# Patient Record
Sex: MALE | Race: WHITE | URBAN - METROPOLITAN AREA
[De-identification: names, ages, dates, MRNs, and addresses within clinical notes are randomized per-mention and may not be internally consistent; named-entity substitution may affect disease eponyms.]

---

## 2024-05-28 ENCOUNTER — TELEPHONE (OUTPATIENT)
Dept: PRIMARY CARE | Facility: CLINIC | Age: 24
End: 2024-05-28

## 2024-05-28 NOTE — TELEPHONE ENCOUNTER
Patient is requesting a virtual visit for athletes foot. He has done this in the past? He'd like to get a script. Can I add him on?

## 2024-05-29 ENCOUNTER — TELEMEDICINE (OUTPATIENT)
Dept: PRIMARY CARE | Facility: CLINIC | Age: 24
End: 2024-05-29

## 2024-05-29 DIAGNOSIS — L30.8 OTHER ECZEMA: Primary | ICD-10-CM

## 2024-05-29 PROCEDURE — 99213 OFFICE O/P EST LOW 20 MIN: CPT | Performed by: FAMILY MEDICINE

## 2024-05-29 RX ORDER — MOMETASONE FUROATE 1 MG/G
OINTMENT TOPICAL DAILY
Qty: 45 G | Refills: 0 | Status: SHIPPED | OUTPATIENT
Start: 2024-05-29 | End: 2024-06-19

## 2024-05-29 NOTE — PROGRESS NOTES
I reviewed and examined the patient. I was present for the key exam elements, and I fully participated in the patient's care. I discussed the management of the care with the resident. I have personally reviewed the pertinent labs and imaging, as well as recent notes, with the patient. I have reviewed the note above and agree with the resident's medical decision making as documented in the resident's note, in addition to the following comments / findings:     Agree with the rest of the plan outlined below by resident physician. No red flags.      The patient understands and agrees to the assessment and plan of care. Patient has also agreed to follow up and comply with the treatment and evaluation as recommended today. Patient was instructed to call the office at [phone] should questions arise regarding their treatment or care.     Lionel Holt DO, FAOASM  Family Medicine   [address], [address]  [place] [address]     Lionel Holt DO

## 2024-05-29 NOTE — PROGRESS NOTES
"Subjective   Patient ID: Laurent Kinsey is a 23 y.o. male who presents for \"athlete's foot.\"    For the last year patient has noticed patchy, dry skin on his left foot plantar surface.  Was prescribed a cream several years ago, but does not remember what cream, however did not help him.  Reports that it is itchy sometimes, does not burn, no discharge, no bleeding.  It is nonerythematous.  He denies a history of eczema or other rashes on any other parts of his body.  He also noticed that his left second digit of his foot has some fungus underneath the toenail.  Is wondering if this is athlete's foot or something else.    PMH: No significant past medical history  Social Hx: never smoker    Denies new onset headaches, fever, chills, n/v/d, chest pain, SOB, abdominal pain, urinary symptoms, and lower extremity edema.     All other systems have been reviewed and are negative.    Objective   Physical Exam  General - NAD over video chat.  Pulmonology - normal effort  Skin -area of dry, patchy, nonerythematous, flaky skin along the sole of his left foot and medial surface of the heel.    Assessment   Laurent is a 23-year-old male with no significant past medical history who presents via telehealth with complaint of a rash on his left foot.  The rash appears more consistent with eczema vs. athlete's foot.  Characteristics more consistent with eczema, as the rash is non erythematous, is not worse with warm/moist environments.  Rash is the same even when he airs his foot out in a flip-flop or without shoes or socks. Overall appearance is dry and flaky.     Plan   We will prescribe topical mometasone 0.1% ointment to use once daily for few weeks.  If the rash does not resolve, please follow-up and we can reevaluate for other causes.  No red flags.  All questions and concerns were addressed.  Patient is agreeable to the established plan of care.  10 minutes was spent speaking with patient and discussing management.    The " patient was staffed with attending physician, Dr. Holt.   Josefa Ribeiro,   [facility], PGY-2

## 2024-12-17 ENCOUNTER — TELEPHONE (OUTPATIENT)
Dept: PRIMARY CARE | Facility: CLINIC | Age: 24
End: 2024-12-17
Payer: COMMERCIAL

## 2024-12-18 ENCOUNTER — TELEMEDICINE (OUTPATIENT)
Dept: PRIMARY CARE | Facility: CLINIC | Age: 24
End: 2024-12-18
Payer: COMMERCIAL

## 2024-12-18 DIAGNOSIS — R05.8 POST-VIRAL COUGH SYNDROME: Primary | ICD-10-CM

## 2024-12-18 PROCEDURE — 99213 OFFICE O/P EST LOW 20 MIN: CPT | Performed by: STUDENT IN AN ORGANIZED HEALTH CARE EDUCATION/TRAINING PROGRAM

## 2024-12-18 RX ORDER — BENZONATATE 200 MG/1
200 CAPSULE ORAL 3 TIMES DAILY PRN
Qty: 30 CAPSULE | Refills: 0 | Status: SHIPPED | OUTPATIENT
Start: 2024-12-18

## 2024-12-18 RX ORDER — FLUTICASONE PROPIONATE 50 MCG
1 SPRAY, SUSPENSION (ML) NASAL DAILY
Qty: 16 G | Refills: 3 | Status: SHIPPED | OUTPATIENT
Start: 2024-12-18 | End: 2025-12-18

## 2024-12-18 NOTE — PROGRESS NOTES
Subjective   Laurent Kinsey is a 24 y.o. male who presents for No chief complaint on file..    HPI  Patient presents virtually today via real time telehealth audio-visual connection from his home due to feeling unwell   Symptoms started about 1.5 weeks ago   Reports persistent cough, described as constant throat clearing worse at night and in the morning with associated post nasal drainage   Has tried OTC cough, cold/flu   Denies fevers, chills, dyspnea, wheeze, sinus pain       ROS:  All pertinent positive symptoms are included in the history of present illness.  All other systems have been reviewed and are negative and noncontributory to this patient's current ailments.    Objective     There were no vitals taken for this visit.  CONSTITUTIONAL - NAD. Not ill appearing.  SKIN - No lesions or rashes visualized. No jaundice.   HEENT- head appears atraumatic and normocephalic. There is no scleral icterus visualized.   RESP - No labored breathing.  PSYCH - Appropriate mood and behavior       Assessment/Plan   Problem List Items Addressed This Visit    None  Visit Diagnoses       Post-viral cough syndrome    -  Primary    Exam limited secondary to virtual nature of todays visit  Symptoms appear most consistent with upper airway cough   Plan to initiate steroid nasal spray to help with post nasal drainage  Can use Tessalon Perles as needed   Instructed to contact the office should symptoms fail to improve or worsen     Relevant Medications    fluticasone (Flonase) 50 mcg/actuation nasal spray    benzonatate (Tessalon) 200 mg capsule            Brianne Velasco DO